# Patient Record
Sex: FEMALE | Race: WHITE | NOT HISPANIC OR LATINO | Employment: STUDENT | URBAN - METROPOLITAN AREA
[De-identification: names, ages, dates, MRNs, and addresses within clinical notes are randomized per-mention and may not be internally consistent; named-entity substitution may affect disease eponyms.]

---

## 2019-09-04 ENCOUNTER — TRANSCRIBE ORDERS (OUTPATIENT)
Dept: GENERAL RADIOLOGY | Facility: HOSPITAL | Age: 19
End: 2019-09-04

## 2019-09-04 ENCOUNTER — HOSPITAL ENCOUNTER (OUTPATIENT)
Dept: GENERAL RADIOLOGY | Facility: HOSPITAL | Age: 19
Discharge: HOME | End: 2019-09-04
Admitting: PEDIATRICS

## 2019-09-04 DIAGNOSIS — M53.3 COCCYX PAIN: ICD-10-CM

## 2019-09-04 DIAGNOSIS — M53.3 COCCYX PAIN: Primary | ICD-10-CM

## 2019-09-04 PROCEDURE — 72220 X-RAY EXAM SACRUM TAILBONE: CPT

## 2020-02-17 ENCOUNTER — OFFICE VISIT (OUTPATIENT)
Dept: INTERNAL MEDICINE | Facility: CLINIC | Age: 20
End: 2020-02-17

## 2020-02-17 VITALS
BODY MASS INDEX: 26.21 KG/M2 | OXYGEN SATURATION: 99 % | HEART RATE: 90 BPM | HEIGHT: 67 IN | DIASTOLIC BLOOD PRESSURE: 60 MMHG | SYSTOLIC BLOOD PRESSURE: 122 MMHG | WEIGHT: 167 LBS

## 2020-02-17 DIAGNOSIS — Z13.29 SCREENING FOR THYROID DISORDER: ICD-10-CM

## 2020-02-17 DIAGNOSIS — Z30.011 ENCOUNTER FOR INITIAL PRESCRIPTION OF CONTRACEPTIVE PILLS: Primary | ICD-10-CM

## 2020-02-17 DIAGNOSIS — Z13.220 SCREENING CHOLESTEROL LEVEL: ICD-10-CM

## 2020-02-17 PROCEDURE — 99203 OFFICE O/P NEW LOW 30 MIN: CPT | Performed by: PHYSICIAN ASSISTANT

## 2020-02-17 RX ORDER — NORGESTIMATE AND ETHINYL ESTRADIOL 7DAYSX3 28
1 KIT ORAL DAILY
Qty: 90 TABLET | Refills: 3 | Status: SHIPPED | OUTPATIENT
Start: 2020-02-17 | End: 2021-01-18 | Stop reason: SDUPTHER

## 2020-02-17 NOTE — PATIENT INSTRUCTIONS

## 2020-02-17 NOTE — PROGRESS NOTES
Patient Care Team:  Sally Aponte MD as PCP - General (Pediatrics)    Chief Complaint::   Chief Complaint   Patient presents with   • Establish Care     Last saw bran Chase        Subjective     HPI  19 years old, karen [employer], majoring sociology.    Goes to gym 1/month. Lives in an apartment, here in [place], with another roommate.  Eats most meals at home.  PMH: Cyst removed around coccyx 7 years ago.  Pt is sexually active.  Desires OCP.  Menses are regular.  LMP now. She states that she did have STD testing through school.  She does not know if she had Gardasil vaccine.        The following portions of the patient's history were reviewed and updated as appropriate: active problem list, medication list, allergies, family history, social history    Review of Systems:   Review of Systems   Constitutional: Negative for activity change, appetite change, chills, diaphoresis, fatigue, fever, unexpected weight gain and unexpected weight loss.   HENT: Negative for congestion, ear pain, hearing loss and sinus pressure.    Eyes: Negative for visual disturbance.   Respiratory: Negative for cough, chest tightness, shortness of breath and wheezing.    Cardiovascular: Negative for chest pain, palpitations and leg swelling.   Gastrointestinal: Negative for abdominal pain, blood in stool, constipation, GERD and indigestion.   Endocrine: Negative for cold intolerance and heat intolerance.   Genitourinary: Negative for dysuria and hematuria.   Musculoskeletal: Negative for arthralgias and myalgias.   Skin: Negative for color change and skin lesions.   Allergic/Immunologic: Negative for environmental allergies.   Neurological: Negative for dizziness, tremors, seizures, syncope, speech difficulty, weakness, headache, memory problem and confusion.   Hematological: Does not bruise/bleed easily.   Psychiatric/Behavioral: Negative for decreased concentration, sleep disturbance and depressed mood. The patient is not  "nervous/anxious.        Vital Signs  Vitals:    02/17/20 0812   BP: 122/60   BP Location: Left arm   Patient Position: Sitting   Cuff Size: Large Adult   Pulse: 90   SpO2: 99%   Weight: 75.8 kg (167 lb)   Height: 170.2 cm (67\")   PainSc: 0-No pain     Body mass index is 26.16 kg/m².    Labs  No visits with results within 3 Month(s) from this visit.   Latest known visit with results is:   No results found for any previous visit.       Imaging  No radiology results for the last 30 days.      Current Outpatient Medications:   •  norgestimate-ethinyl estradiol (TRI-SPRINTEC) 0.18/0.215/0.25 MG-35 MCG per tablet, Take 1 tablet by mouth Daily., Disp: 90 tablet, Rfl: 3    Physical Exam:    Physical Exam   Constitutional: She is oriented to person, place, and time. She appears well-developed and well-nourished.   HENT:   Head: Normocephalic and atraumatic.   Right Ear: Hearing, tympanic membrane, external ear and ear canal normal.   Left Ear: Hearing, tympanic membrane, external ear and ear canal normal.   Nose: Nose normal.   Mouth/Throat: Uvula is midline, oropharynx is clear and moist and mucous membranes are normal.   Eyes: Pupils are equal, round, and reactive to light. Conjunctivae, EOM and lids are normal.   Neck: Normal range of motion and full passive range of motion without pain. Neck supple.   Cardiovascular: Normal rate, regular rhythm, normal heart sounds and intact distal pulses.   Pulmonary/Chest: Effort normal and breath sounds normal.   Abdominal: Soft. Normal appearance and bowel sounds are normal.   Musculoskeletal: Normal range of motion.   Neurological: She is alert and oriented to person, place, and time. She has normal strength and normal reflexes.   Skin: Skin is warm, dry and intact.   Psychiatric: She has a normal mood and affect. Her speech is normal and behavior is normal. Judgment and thought content normal. Cognition and memory are normal.   Vitals reviewed.      Procedures    "     Assessment/Plan   Problem List Items Addressed This Visit        Other    Encounter for initial prescription of contraceptive pills - Primary    Relevant Medications    norgestimate-ethinyl estradiol (TRI-SPRINTEC) 0.18/0.215/0.25 MG-35 MCG per tablet    Screening cholesterol level    Relevant Orders    CBC & Differential    Comprehensive Metabolic Panel    Lipid Panel    Screening for thyroid disorder    Relevant Orders    TSH    T4, Free    BMI 26.0-26.9,adult    Current Assessment & Plan     Discussed importance of regular cardiovascular exercise 3-4 times a week.  Discussed healthy eating-more fresh fruits and veggies.                Return in about 1 year (around 2/17/2021) for Recheck.    Plan of care reviewed with patient at the conclusion of today's visit. Education was provided regarding diagnosis, management, and any prescribed or recommended OTC medications.Patient verbalizes understanding of and agreement with management plan.       Coby Lopez PA-C    Please note that portions of this note were completed with a voice recognition program. Efforts were made to edit the dictations, but occasionally words are mistranscribed.

## 2020-02-17 NOTE — ASSESSMENT & PLAN NOTE
Discussed importance of regular cardiovascular exercise 3-4 times a week.  Discussed healthy eating-more fresh fruits and veggies.

## 2020-09-16 ENCOUNTER — TELEPHONE (OUTPATIENT)
Dept: INTERNAL MEDICINE | Facility: CLINIC | Age: 20
End: 2020-09-16

## 2020-09-16 NOTE — TELEPHONE ENCOUNTER
Labs ordered 02/17/20. Mychart reminder sent 08/07/20. Reminder letter mailed 03/06/20. Can we cancel lab orders?

## 2021-01-18 DIAGNOSIS — Z30.011 ENCOUNTER FOR INITIAL PRESCRIPTION OF CONTRACEPTIVE PILLS: ICD-10-CM

## 2021-01-18 RX ORDER — NORGESTIMATE AND ETHINYL ESTRADIOL 7DAYSX3 28
1 KIT ORAL DAILY
Qty: 90 TABLET | Refills: 3 | Status: SHIPPED | OUTPATIENT
Start: 2021-01-18 | End: 2021-12-23 | Stop reason: SDUPTHER

## 2021-01-18 NOTE — TELEPHONE ENCOUNTER
Caller: Socorro Spaulding    Relationship: Self    Best call back number:[phone]    Medication needed:   Requested Prescriptions     Pending Prescriptions Disp Refills   • norgestimate-ethinyl estradiol (Tri-Sprintec) 0.18/0.215/0.25 MG-35 MCG per tablet 90 tablet 3     Sig: Take 1 tablet by mouth Daily.       When do you need the refill by: ASAP        Does the patient have less than a 3 day supply:  [x] Yes  [] No    What is the patient's preferred pharmacy: [facility] [facility] [phone] [facility] [phone] FX           PLEASE CALL BACK AND ADVISE :    [phone]

## 2021-02-23 ENCOUNTER — LAB (OUTPATIENT)
Dept: LAB | Facility: HOSPITAL | Age: 21
End: 2021-02-23

## 2021-02-23 ENCOUNTER — OFFICE VISIT (OUTPATIENT)
Dept: INTERNAL MEDICINE | Facility: CLINIC | Age: 21
End: 2021-02-23

## 2021-02-23 VITALS
HEART RATE: 95 BPM | HEIGHT: 67 IN | WEIGHT: 141.2 LBS | OXYGEN SATURATION: 98 % | SYSTOLIC BLOOD PRESSURE: 103 MMHG | TEMPERATURE: 97.8 F | BODY MASS INDEX: 22.16 KG/M2 | DIASTOLIC BLOOD PRESSURE: 68 MMHG

## 2021-02-23 DIAGNOSIS — Z20.2 EXPOSURE TO STD: Primary | ICD-10-CM

## 2021-02-23 DIAGNOSIS — Z20.2 EXPOSURE TO STD: ICD-10-CM

## 2021-02-23 DIAGNOSIS — Z30.011 ENCOUNTER FOR INITIAL PRESCRIPTION OF CONTRACEPTIVE PILLS: ICD-10-CM

## 2021-02-23 DIAGNOSIS — Z01.419 ROUTINE GYNECOLOGICAL EXAMINATION: ICD-10-CM

## 2021-02-23 DIAGNOSIS — A60.00 GENITAL HERPES SIMPLEX TYPE 1 INFECTION: ICD-10-CM

## 2021-02-23 PROCEDURE — 86592 SYPHILIS TEST NON-TREP QUAL: CPT

## 2021-02-23 PROCEDURE — G0432 EIA HIV-1/HIV-2 SCREEN: HCPCS

## 2021-02-23 PROCEDURE — 99395 PREV VISIT EST AGE 18-39: CPT | Performed by: PHYSICIAN ASSISTANT

## 2021-02-23 RX ORDER — ACYCLOVIR 400 MG/1
400 TABLET ORAL 2 TIMES DAILY
COMMUNITY
End: 2022-08-19 | Stop reason: SDUPTHER

## 2021-02-23 NOTE — PROGRESS NOTES
Patient Care Team:  Coby Lopez PA-C as PCP - General (Internal Medicine)    Chief Complaint::   Chief Complaint   Patient presents with   • Annual Exam     physical with pap        Subjective     HPI  20 year old female presents for routine gynecological exam.  Currently using Tri-Sprintec as directed.  She is sexually active.  She denies dyspareunia, vaginal discharge, or vaginal odor.  This is her first Pap smear.  Graduating in May. 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   Exercises once weekly. Tries to eat healthy.     Type I HSV positive-had initial outbreak on labia and was treated in June. Had full STD panel in June.   On menses right now.  She is on day 2.         The following portions of the patient's history were reviewed and updated as appropriate: active problem list, medication list, allergies, family history, social history    Review of Systems:   Review of Systems   Constitutional: Negative for activity change, appetite change, diaphoresis, fatigue, unexpected weight gain and unexpected weight loss.   HENT: Negative for hearing loss.    Eyes: Negative for visual disturbance.   Respiratory: Negative for chest tightness and shortness of breath.    Cardiovascular: Negative for chest pain, palpitations and leg swelling.   Gastrointestinal: Negative for abdominal pain, blood in stool, GERD and indigestion.   Endocrine: Negative for cold intolerance and heat intolerance.   Genitourinary: Positive for vaginal bleeding. Negative for dysuria, hematuria, menstrual problem, vaginal discharge and vaginal pain.   Musculoskeletal: Negative for arthralgias and myalgias.   Skin: Negative for skin lesions.   Neurological: Negative for tremors, seizures, syncope, speech difficulty, weakness, headache, memory problem and confusion.   Hematological: Does not bruise/bleed easily.   Psychiatric/Behavioral: Negative for sleep disturbance and depressed mood. The patient is not nervous/anxious.        Vital  "Signs  Vitals:    02/23/21 1515   BP: 103/68   BP Location: Right arm   Patient Position: Sitting   Cuff Size: Adult   Pulse: 95   Temp: 97.8 °F (36.6 °C)   TempSrc: Temporal   SpO2: 98%   Weight: 64 kg (141 lb 3.2 oz)   Height: 170.2 cm (67.01\")   PainSc: 0-No pain     Body mass index is 22.11 kg/m².    Labs  No visits with results within 3 Month(s) from this visit.   Latest known visit with results is:   No results found for any previous visit.       Imaging  No radiology results for the last 30 days.      Current Outpatient Medications:   •  acyclovir (ZOVIRAX) 400 MG tablet, Take 400 mg by mouth 2 (Two) Times a Day. Take no more than 5 doses a day., Disp: , Rfl:   •  norgestimate-ethinyl estradiol (Tri-Sprintec) 0.18/0.215/0.25 MG-35 MCG per tablet, Take 1 tablet by mouth Daily., Disp: 90 tablet, Rfl: 3    Physical Exam:    Physical Exam  Exam conducted with a chaperone present.   Constitutional:       Appearance: Normal appearance.   HENT:      Head: Normocephalic and atraumatic.   Eyes:      Extraocular Movements: Extraocular movements intact.      Conjunctiva/sclera: Conjunctivae normal.      Pupils: Pupils are equal, round, and reactive to light.   Neck:      Musculoskeletal: Neck supple.   Cardiovascular:      Rate and Rhythm: Normal rate and regular rhythm.      Pulses: Normal pulses.      Heart sounds: Normal heart sounds.   Chest:      Breasts:         Right: Normal. No mass, nipple discharge or tenderness.         Left: Normal. No mass, nipple discharge or tenderness.       Abdominal:      General: Abdomen is flat. Bowel sounds are normal.      Palpations: Abdomen is soft.   Genitourinary:     General: Normal vulva.      Rectum: Normal.   Skin:     General: Skin is warm and dry.   Neurological:      General: No focal deficit present.      Mental Status: She is alert and oriented to person, place, and time. Mental status is at baseline.   Psychiatric:         Mood and Affect: Mood normal.         Thought " Content: Thought content normal.         Judgment: Judgment normal.         Procedures        Assessment/Plan   Problem List Items Addressed This Visit        Endocrine and Metabolic    BMI 22.0-22.9, adult    Overview     Discussed importance of healthy diet and regular cardiovascular exercise.            Genitourinary and Reproductive     Encounter for initial prescription of contraceptive pills    Overview     Continue norgestimate ethinyl estradiol tablets daily.  Refill for 1 year was sent in January.         Relevant Medications    norgestimate-ethinyl estradiol (Tri-Sprintec) 0.18/0.215/0.25 MG-35 MCG per tablet       Health Encounters    Routine gynecological examination    Overview     Breast exam unremarkable.  Pap obtained.  Pelvic exam within normal range.  Patient was on her menstrual cycle.  Additional testing for HPV, gonorrhea, and chlamydia obtained.         Relevant Orders    Liquid-based Pap Smear, Screening       Infectious Diseases    Genital herpes simplex type 1 infection    Overview     Diagnosed summer 2020.  Patient currently takes acyclovir 400 mg daily.         Relevant Medications    acyclovir (ZOVIRAX) 400 MG tablet    Exposure to STD - Primary    Relevant Orders    RPR    HIV-1 / O / 2 Ag / Antibody 4th Generation        Patient Wellness Counseling:   Plan of care reviewed with patient at the conclusion of today's visit. Education was provided in regards to diagnosis, diet and exercise, cervical cancer screening, self breast exams.  Nutrition, family planning/contraception, physical activity, healthy weight,ways to reduce stress, adequate sleep, injury prevention, misuse of tobacco, alcohol and drugs, sexual behavior and STD's, dental health, mental health, and immunizations.    Management and any prescribed or recommended OTC medications.  Patient verbalizes understanding of and agreement with management plan.    Return in about 1 year (around 2/23/2022) for Annual physical with  pap.    Plan of care reviewed with patient at the conclusion of today's visit. Education was provided regarding diagnosis, management, and any prescribed or recommended OTC medications.Patient verbalizes understanding of and agreement with management plan.       Coby Lopez PA-C    Please note that portions of this note were completed with a voice recognition program. Efforts were made to edit the dictations, but occasionally words are mistranscribed.

## 2021-02-24 LAB
HIV1+2 AB SER QL: NORMAL
RPR SER QL: NORMAL

## 2021-03-05 ENCOUNTER — TELEPHONE (OUTPATIENT)
Dept: INTERNAL MEDICINE | Facility: CLINIC | Age: 21
End: 2021-03-05

## 2021-03-05 NOTE — TELEPHONE ENCOUNTER
The phone # in the chart is moms. Mom gave me daughters # [phone]. I have called and left a voicemail. I will send a portal.

## 2021-03-05 NOTE — TELEPHONE ENCOUNTER
LM for pt to call back.    ----- Message from Coby Lopez PA-C sent at 3/4/2021  5:28 PM EST -----  NONDIAGNOSTIC SPECIMEN. HPV AND STD PANEL NEGATIVE.  She will have to return in 4-6 weeks for repeat pap     Seizure

## 2021-03-29 ENCOUNTER — IMMUNIZATION (OUTPATIENT)
Dept: VACCINE CLINIC | Facility: HOSPITAL | Age: 21
End: 2021-03-29

## 2021-03-29 PROCEDURE — 91300 HC SARSCOV02 VAC 30MCG/0.3ML IM: CPT | Performed by: INTERNAL MEDICINE

## 2021-03-29 PROCEDURE — 0001A: CPT | Performed by: INTERNAL MEDICINE

## 2021-04-13 ENCOUNTER — OFFICE VISIT (OUTPATIENT)
Dept: INTERNAL MEDICINE | Facility: CLINIC | Age: 21
End: 2021-04-13

## 2021-04-13 VITALS
DIASTOLIC BLOOD PRESSURE: 69 MMHG | OXYGEN SATURATION: 100 % | SYSTOLIC BLOOD PRESSURE: 98 MMHG | WEIGHT: 140.2 LBS | BODY MASS INDEX: 22 KG/M2 | HEIGHT: 67 IN | HEART RATE: 83 BPM | TEMPERATURE: 98.6 F

## 2021-04-13 DIAGNOSIS — Z12.72 SMEAR, VAGINAL, AS PART OF ROUTINE GYNECOLOGICAL EXAMINATION: Primary | ICD-10-CM

## 2021-04-13 DIAGNOSIS — Z01.419 SMEAR, VAGINAL, AS PART OF ROUTINE GYNECOLOGICAL EXAMINATION: Primary | ICD-10-CM

## 2021-04-13 NOTE — PROGRESS NOTES
"Patient Care Team:  Coby Lopez PA-C as PCP - General (Internal Medicine)    Chief Complaint::   Chief Complaint   Patient presents with   • repeat pap smear        Subjective     HPI  Socorro is a 20-year-old female who presents today for repeat Pap due to nondiagnostic specimen of last Pap smear performed February 23, 2021.  This is a no charge visit.        The following portions of the patient's history were reviewed and updated as appropriate: active problem list, medication list, allergies, family history, social history    Review of Systems:   Review of Systems   Constitutional: Negative for activity change, appetite change, diaphoresis, fatigue, unexpected weight gain and unexpected weight loss.   HENT: Negative for hearing loss.    Eyes: Negative for visual disturbance.   Respiratory: Negative for chest tightness and shortness of breath.    Cardiovascular: Negative for chest pain, palpitations and leg swelling.   Gastrointestinal: Negative for abdominal pain, blood in stool, GERD and indigestion.   Endocrine: Negative for cold intolerance and heat intolerance.   Genitourinary: Negative for dysuria and hematuria.   Musculoskeletal: Negative for arthralgias and myalgias.   Skin: Negative for skin lesions.   Neurological: Negative for tremors, seizures, syncope, speech difficulty, weakness, headache, memory problem and confusion.   Hematological: Does not bruise/bleed easily.   Psychiatric/Behavioral: Negative for sleep disturbance and depressed mood. The patient is not nervous/anxious.        Vital Signs  Vitals:    04/13/21 1351   BP: 98/69   BP Location: Left arm   Patient Position: Sitting   Cuff Size: Adult   Pulse: 83   Temp: 98.6 °F (37 °C)   TempSrc: Temporal   SpO2: 100%   Weight: 63.6 kg (140 lb 3.2 oz)   Height: 170.2 cm (67.01\")   PainSc: 0-No pain     Body mass index is 21.95 kg/m².    Labs  Lab on 02/23/2021   Component Date Value Ref Range Status   • RPR 02/23/2021 Non-Reactive  " Non-Reactive Final   • HIV-1/ HIV-2 02/23/2021 Non-Reactive  Non-Reactive Final    A non-reactive test result does not preclude the possibility of exposure to HIV or infection with HIV. An antibody response to recent exposure may take several months to reach detectable levels.       Imaging  No radiology results for the last 30 days.      Current Outpatient Medications:   •  acyclovir (ZOVIRAX) 400 MG tablet, Take 400 mg by mouth 2 (Two) Times a Day. Take no more than 5 doses a day., Disp: , Rfl:   •  norgestimate-ethinyl estradiol (Tri-Sprintec) 0.18/0.215/0.25 MG-35 MCG per tablet, Take 1 tablet by mouth Daily., Disp: 90 tablet, Rfl: 3    Physical Exam:    Physical Exam  Exam conducted with a chaperone present.   Genitourinary:     General: Normal vulva.      Vagina: Normal.      Cervix: Normal.      Uterus: Normal.       Rectum: Normal.   Psychiatric:         Mood and Affect: Mood normal.         Behavior: Behavior normal.         Thought Content: Thought content normal.         Judgment: Judgment normal.         Procedures        Assessment/Plan   Problem List Items Addressed This Visit        Genitourinary and Reproductive     Smear, vaginal, as part of routine gynecological examination - Primary    Current Assessment & Plan     Repeat Pap smear due to nondiagnostic specimen.         Relevant Orders    Liquid-based Pap Smear, Screening          No follow-ups on file.    Plan of care reviewed with patient at the conclusion of today's visit. Education was provided regarding diagnosis, management, and any prescribed or recommended OTC medications.Patient verbalizes understanding of and agreement with management plan.       Yanelis Granados MA    Please note that portions of this note were completed with a voice recognition program. Efforts were made to edit the dictations, but occasionally words are mistranscribed.

## 2021-04-16 ENCOUNTER — TELEPHONE (OUTPATIENT)
Dept: INTERNAL MEDICINE | Facility: CLINIC | Age: 21
End: 2021-04-16

## 2021-04-19 ENCOUNTER — IMMUNIZATION (OUTPATIENT)
Dept: VACCINE CLINIC | Facility: HOSPITAL | Age: 21
End: 2021-04-19

## 2021-04-19 PROCEDURE — 91300 HC SARSCOV02 VAC 30MCG/0.3ML IM: CPT | Performed by: INTERNAL MEDICINE

## 2021-04-19 PROCEDURE — 0002A: CPT | Performed by: INTERNAL MEDICINE

## 2021-12-23 DIAGNOSIS — Z30.011 ENCOUNTER FOR INITIAL PRESCRIPTION OF CONTRACEPTIVE PILLS: ICD-10-CM

## 2021-12-23 RX ORDER — NORGESTIMATE AND ETHINYL ESTRADIOL 7DAYSX3 28
1 KIT ORAL DAILY
Qty: 90 TABLET | Refills: 3 | Status: SHIPPED | OUTPATIENT
Start: 2021-12-23 | End: 2022-11-21 | Stop reason: SDUPTHER

## 2021-12-23 NOTE — TELEPHONE ENCOUNTER
Caller: Socorro Spaulding    Relationship: Self    Best call back number: [phone]    Requested Prescriptions:   Requested Prescriptions     Pending Prescriptions Disp Refills   • norgestimate-ethinyl estradiol (Tri-Sprintec) 0.18/0.215/0.25 MG-35 MCG per tablet 90 tablet 3     Sig: Take 1 tablet by mouth Daily.        Pharmacy where request should be sent: [address] [facility] [phone] [facility] [phone] FX     Additional details provided by patient: REQUESTING A REFILL ON BIRTH CONTROL UNTIL APPOINTMENT SCHEDULED ON 2/24/22    Does the patient have less than a 3 day supply:  [] Yes  [x] No    Ham Hugo Rep   12/23/21 10:11 EST

## 2021-12-23 NOTE — TELEPHONE ENCOUNTER
Rx Refill Note  Requested Prescriptions     Pending Prescriptions Disp Refills   • norgestimate-ethinyl estradiol (Tri-Sprintec) 0.18/0.215/0.25 MG-35 MCG per tablet 90 tablet 3     Sig: Take 1 tablet by mouth Daily.      Last office visit with prescribing clinician: 4/13/2021      Next office visit with prescribing clinician: 2/24/2022            Anny Hines LPN  12/23/21, 13:22 EST

## 2021-12-23 NOTE — TELEPHONE ENCOUNTER
Caller: Socorro Spaulding    Relationship: Self    Best call back number: [phone]    What is the best time to reach you: ANYTIME    Who are you requesting to speak with (clinical staff, provider,  specific staff member): CLINICAL STAFF    What was the call regarding: PATIENT STATES THAT SHE IS COVID POSITIVE AND WOULD LIKE TO KNOW WHAT IS THE QUARANTINE GUIDELINE . PATIENT IS NOT ABLE TO GET IN TOUCH WITH THE HEALTH DEPARTMENT    Do you require a callback: YES

## 2022-02-24 ENCOUNTER — OFFICE VISIT (OUTPATIENT)
Dept: INTERNAL MEDICINE | Facility: CLINIC | Age: 22
End: 2022-02-24

## 2022-02-24 VITALS
SYSTOLIC BLOOD PRESSURE: 122 MMHG | HEART RATE: 76 BPM | WEIGHT: 146 LBS | BODY MASS INDEX: 22.91 KG/M2 | OXYGEN SATURATION: 100 % | DIASTOLIC BLOOD PRESSURE: 88 MMHG | TEMPERATURE: 98 F | HEIGHT: 67 IN

## 2022-02-24 DIAGNOSIS — Z13.29 THYROID DISORDER SCREENING: Primary | ICD-10-CM

## 2022-02-24 DIAGNOSIS — A60.00 GENITAL HERPES SIMPLEX TYPE 1 INFECTION: ICD-10-CM

## 2022-02-24 DIAGNOSIS — Z13.21 ENCOUNTER FOR VITAMIN DEFICIENCY SCREENING: ICD-10-CM

## 2022-02-24 DIAGNOSIS — Z13.220 LIPID SCREENING: ICD-10-CM

## 2022-02-24 DIAGNOSIS — Z00.00 ROUTINE PHYSICAL EXAMINATION: ICD-10-CM

## 2022-02-24 PROCEDURE — 99395 PREV VISIT EST AGE 18-39: CPT | Performed by: PHYSICIAN ASSISTANT

## 2022-02-24 NOTE — PROGRESS NOTES
"QUICK REFERENCE INFORMATION:  The ABCs of the Annual Wellness Visit    Annual Wellness visit    HEALTH RISK ASSESSMENT    2000    Recent History  {Hospitalization history:0108310685::\"No hospitalization(s) within the last year.\"}.        Current Medical Providers:  Patient Care Team:  Coby Lopez PA-C as PCP - General (Internal Medicine)        Smoking Status:  Social History     Tobacco Use   Smoking Status Never Smoker   Smokeless Tobacco Never Used       Alcohol Consumption:  Social History     Substance and Sexual Activity   Alcohol Use Never       Depression Screen:   PHQ-2/PHQ-9 Depression Screening 2/23/2021   Little interest or pleasure in doing things 0   Feeling down, depressed, or hopeless 0   Total Score 0       Health Habits:              Recent Lab Results:  CMP:     Lipid Panel:     HbA1c:           Age-appropriate Screening Schedule:  Refer to the list below for future screening recommendations based on patient's age, sex and/or medical conditions. Orders for these recommended tests are listed in the plan section. The patient has been provided with a written plan.    Health Maintenance   Topic Date Due   • TDAP/TD VACCINES (1 - Tdap) Never done   • CHLAMYDIA SCREENING  Never done   • INFLUENZA VACCINE  Never done   • PAP SMEAR  04/13/2024        Subjective   History of Present Illness    Socorro Spaulding is a 21 y.o. female who presents for an Annual Wellness Visit.    The following portions of the patient's history were reviewed and updated as appropriate: {history reviewed:20406::\"allergies\",\"current medications\",\"past family history\",\"past medical history\",\"past social history\",\"past surgical history\",\"problem list\"}.    Outpatient Medications Prior to Visit   Medication Sig Dispense Refill   • acyclovir (ZOVIRAX) 400 MG tablet Take 400 mg by mouth 2 (Two) Times a Day. Take no more than 5 doses a day.     • norgestimate-ethinyl estradiol (Tri-Sprintec) 0.18/0.215/0.25 MG-35 " "MCG per tablet Take 1 tablet by mouth Daily. 90 tablet 3     No facility-administered medications prior to visit.       Patient Active Problem List   Diagnosis   • Encounter for initial prescription of contraceptive pills   • Screening cholesterol level   • Screening for thyroid disorder   • BMI 22.0-22.9, adult   • Genital herpes simplex type 1 infection   • Routine gynecological examination   • Exposure to STD   • Smear, vaginal, as part of routine gynecological examination       Advance Care Planning:  {Advance Directive Status:09610}    Identification of Risk Factors:  Risk factors include: {MC; WELLNESS RISK FACTORS:95768}.    Review of Systems    Compared to one year ago, the patient feels her physical health is {better worse same:94185}.  Compared to one year ago, the patient feels her mental health is {better worse same:30842}.    Objective     Physical Exam    Vitals:    02/24/22 1552   Height: 170.2 cm (67.01\")       Patient's Body mass index is 21.95 kg/m². indicating that she is {weight categories:13835}.      CMP:     HbA1c:  No results found for: HGBA1C  Microalbumin:  No results found for: MICROALBUR, POCMALB, POCCREAT  Lipid Panel  No results found for: CHOL, TRIG, HDL, LDL, AST, ALT    Assessment/Plan   Patient Self-Management and Personalized Health Advice  The patient has been provided with information about: {; PERSONALIZED HEALTH ADVICE:92691} and preventive services including:   · {plan:22045}.    Problem List Items Addressed This Visit     None           There are no Patient Instructions on file for this visit.    Outpatient Encounter Medications as of 2/24/2022   Medication Sig Dispense Refill   • acyclovir (ZOVIRAX) 400 MG tablet Take 400 mg by mouth 2 (Two) Times a Day. Take no more than 5 doses a day.     • norgestimate-ethinyl estradiol (Tri-Sprintec) 0.18/0.215/0.25 MG-35 MCG per tablet Take 1 tablet by mouth Daily. 90 tablet 3     No facility-administered encounter medications on file " as of 2/24/2022.       Reviewed use of high risk medication in the elderly: {Response; yes/no/na:63}  Reviewed for potential of harmful drug interactions in the elderly: {Response; yes/no/na:63}    Follow Up:  No follow-ups on file.     An After Visit Summary and PPPS with all of these plans were given to the patient.           {Time Spent (Optional):28789}    Yanelis Granados MA    Note: Part of this note may be an electronic transcription/translation of spoken language to printed text using the Dragon Dictation System.

## 2022-02-24 NOTE — PROGRESS NOTES
[facility] Internal Medicine    Socorro Spaulding  2000   [id number]      Patient Care Team:  Coby Lopez PA-C as PCP - General (Internal Medicine)    Chief Complaint::   Chief Complaint   Patient presents with   • Annual Exam        HPI  Socorro is a 21 year old female with history of herpes type I who presents for routine physical exam.  Socorro had 2 Covid vaccinations, did not receive booster.  She had COVID in December in 2021. Mild symptoms.  She denies chest pain, shortness of breath, palpitations, or headaches.  She denies flu vaccine.  She is single and lives with a friend in an apartment. She works full time at [employer].   She is exercising regularly-does cardio/weight training 4 days a week.  She eats out on the weekends.       Patient Active Problem List   Diagnosis   • Encounter for initial prescription of contraceptive pills   • Screening cholesterol level   • Screening for thyroid disorder   • BMI 22.0-22.9, adult   • Genital herpes simplex type 1 infection   • Routine gynecological examination   • Exposure to STD   • Smear, vaginal, as part of routine gynecological examination   • Routine physical examination        History reviewed. No pertinent past medical history.    Past Surgical History:   Procedure Laterality Date   • CYST REMOVAL  2013   • WISDOM TOOTH EXTRACTION  04/2019       Family History   Problem Relation Age of Onset   • Cancer Maternal Grandmother    • Cancer Paternal Grandfather        Social History     Socioeconomic History   • Marital status: Single   Tobacco Use   • Smoking status: Never Smoker   • Smokeless tobacco: Never Used   Substance and Sexual Activity   • Alcohol use: Never   • Drug use: Never   • Sexual activity: Defer       No Known Allergies    Review of Systems   Constitutional: Positive for unexpected weight gain. Negative for activity change, appetite change, diaphoresis, fatigue and unexpected weight loss.   HENT: Negative for hearing loss.   "  Eyes: Negative for blurred vision, double vision and visual disturbance.   Respiratory: Negative for chest tightness and shortness of breath.    Cardiovascular: Negative for chest pain, palpitations and leg swelling.   Gastrointestinal: Negative for abdominal pain, blood in stool, GERD and indigestion.   Endocrine: Negative for cold intolerance and heat intolerance.   Genitourinary: Negative for dysuria, hematuria and menstrual problem.   Musculoskeletal: Negative for arthralgias and myalgias.   Skin: Negative for skin lesions.   Allergic/Immunologic: Negative for environmental allergies and food allergies.   Neurological: Negative for tremors, seizures, syncope, speech difficulty, weakness, headache, memory problem and confusion.   Hematological: Does not bruise/bleed easily.   Psychiatric/Behavioral: Negative for sleep disturbance and depressed mood. The patient is not nervous/anxious.         Vital Signs  Vitals:    02/24/22 1552   BP: 122/88   BP Location: Left arm   Patient Position: Sitting   Cuff Size: Adult   Pulse: 76   Temp: 98 °F (36.7 °C)   TempSrc: Temporal   SpO2: 100%   Weight: 66.2 kg (146 lb)   Height: 170.2 cm (67.01\")   PainSc: 0-No pain     Body mass index is 22.86 kg/m².  Patient's Body mass index is 22.86 kg/m². indicating that she is within normal range (BMI 18.5-24.9). No BMI management plan needed..         Current Outpatient Medications:   •  acyclovir (ZOVIRAX) 400 MG tablet, Take 400 mg by mouth 2 (Two) Times a Day. Take no more than 5 doses a day., Disp: , Rfl:   •  norgestimate-ethinyl estradiol (Tri-Sprintec) 0.18/0.215/0.25 MG-35 MCG per tablet, Take 1 tablet by mouth Daily., Disp: 90 tablet, Rfl: 3    Physical Exam  Vitals reviewed.   Constitutional:       Appearance: Normal appearance. She is well-developed and normal weight.   HENT:      Head: Normocephalic and atraumatic.      Right Ear: Hearing, tympanic membrane, ear canal and external ear normal.      Left Ear: Hearing, " tympanic membrane, ear canal and external ear normal.      Nose: Nose normal.      Mouth/Throat:      Mouth: Mucous membranes are moist.      Pharynx: Oropharynx is clear. Uvula midline. No posterior oropharyngeal erythema.   Eyes:      General: Lids are normal.      Extraocular Movements: Extraocular movements intact.      Conjunctiva/sclera: Conjunctivae normal.      Pupils: Pupils are equal, round, and reactive to light.   Cardiovascular:      Rate and Rhythm: Normal rate and regular rhythm.      Heart sounds: Normal heart sounds.   Pulmonary:      Effort: Pulmonary effort is normal.      Breath sounds: Normal breath sounds.   Abdominal:      General: Bowel sounds are normal.      Palpations: Abdomen is soft.      Tenderness: There is no right CVA tenderness or left CVA tenderness.   Musculoskeletal:         General: Normal range of motion.      Cervical back: Full passive range of motion without pain, normal range of motion and neck supple.   Skin:     General: Skin is warm and dry.   Neurological:      General: No focal deficit present.      Mental Status: She is alert and oriented to person, place, and time. Mental status is at baseline.      Deep Tendon Reflexes: Reflexes are normal and symmetric.   Psychiatric:         Mood and Affect: Mood normal.         Speech: Speech normal.         Behavior: Behavior normal.         Thought Content: Thought content normal.         Judgment: Judgment normal.          ACE III MINI            Results Review:    No results found for this or any previous visit (from the past 672 hour(s)).  Procedures    Medication Review: Medications reviewed and noted    Social History     Socioeconomic History   • Marital status: Single   Tobacco Use   • Smoking status: Never Smoker   • Smokeless tobacco: Never Used   Substance and Sexual Activity   • Alcohol use: Never   • Drug use: Never   • Sexual activity: Defer        Assessment/Plan:    Problem List Items Addressed This Visit         Health Encounters    Routine physical examination    Overview     Order for fasting labs placed.  She has completed 2 Covid vaccinations.  Discussed importance of booster dose.         Relevant Orders    CBC & Differential    Comprehensive Metabolic Panel       Infectious Diseases    Genital herpes simplex type 1 infection    Overview     Diagnosed summer 2020.  Has completed daily acyclovir for 1 year.  She has not had any other outbreaks.         Relevant Medications    acyclovir (ZOVIRAX) 400 MG tablet      Other Visit Diagnoses     Thyroid disorder screening    -  Primary    Relevant Orders    TSH    T4, Free    T3, Free    Lipid screening        Relevant Orders    Lipid Panel    Encounter for vitamin deficiency screening        Relevant Orders    Vitamin D 25 Hydroxy    Vitamin B12       Patient Wellness Counseling:   Plan of care reviewed with patient at the conclusion of today's visit. Education was provided in regards to diagnosis, diet and exercise, cervical cancer screening, self breast exams,   Nutrition, family planning/contraception, physical activity, healthy weight,ways to reduce stress, adequate sleep, injury prevention, misuse of tobacco, alcohol and drugs, sexual behavior and STD's, dental health, mental health, and immunizations.        There are no Patient Instructions on file for this visit.     Plan of care reviewed with patient at the conclusion of today's visit. Education was provided regarding diagnosis, management, and any prescribed or recommended OTC medications.Patient verbalizes understanding of and agreement with management plan.       I spent 21 minutes caring for Socorro on this date of service. This time includes time spent by me in the following activities:preparing for the visit, reviewing tests, obtaining and/or reviewing a separately obtained history, performing a medically appropriate examination and/or evaluation , counseling and educating the patient/family/caregiver, ordering  medications, tests, or procedures and documenting information in the medical record    Coby Lopez PA-C      Note: Part of this note may be an electronic transcription/translation of spoken language to printed text using the Dragon Dictation system.

## 2022-08-19 RX ORDER — ACYCLOVIR 400 MG/1
400 TABLET ORAL 2 TIMES DAILY
Qty: 30 TABLET | Refills: 1 | Status: SHIPPED | OUTPATIENT
Start: 2022-08-19 | End: 2023-02-27 | Stop reason: SDUPTHER

## 2022-08-19 NOTE — TELEPHONE ENCOUNTER
Caller: Socorro Spaulding    Relationship: Self    Best call back number: [phone]     Requested Prescriptions:   Requested Prescriptions     Pending Prescriptions Disp Refills   • acyclovir (ZOVIRAX) 400 MG tablet       Sig: Take 1 tablet by mouth 2 (Two) Times a Day. Take no more than 5 doses a day.        Pharmacy where request should be sent: [facility] [address] [facility] [phone] [facility] [phone]     Additional details provided by patient: PATIENT IS HAVING SORE ON HER GENITAL AREA.    Does the patient have less than a 3 day supply:  [x] Yes  [] No    Ham Sinclair Rep   08/19/22 08:56 EDT

## 2022-11-21 DIAGNOSIS — Z30.011 ENCOUNTER FOR INITIAL PRESCRIPTION OF CONTRACEPTIVE PILLS: ICD-10-CM

## 2022-11-21 RX ORDER — NORGESTIMATE AND ETHINYL ESTRADIOL 7DAYSX3 28
1 KIT ORAL DAILY
Qty: 90 TABLET | Refills: 3 | Status: SHIPPED | OUTPATIENT
Start: 2022-11-21 | End: 2023-02-27 | Stop reason: SDUPTHER

## 2022-11-21 NOTE — TELEPHONE ENCOUNTER
Caller: Socorro Spaulding    Relationship: Self    Best call back number: [phone]    Requested Prescriptions:   Requested Prescriptions     Pending Prescriptions Disp Refills   • norgestimate-ethinyl estradiol (Tri-Sprintec) 0.18/0.215/0.25 MG-35 MCG per tablet 90 tablet 3     Sig: Take 1 tablet by mouth Daily.        Pharmacy where request should be sent: [facility] [id number] [address] [facility] [phone] [facility] [phone] FX     Additional details provided by patient: 6 DAYS LEFT. PATIENT STATES SHE HAS NO REFILLS LEFT. PATIENT HAS AN APPOINTMENT FOR A PHYSICAL WITH PAP WITH PCP ON 2/27/23 AT 4:00 PM.    Does the patient have less than a 3 day supply:  [] Yes  [x] No    Ham Amato Rep   11/21/22 12:57 EST

## 2023-02-27 ENCOUNTER — OFFICE VISIT (OUTPATIENT)
Dept: INTERNAL MEDICINE | Facility: CLINIC | Age: 23
End: 2023-02-27
Payer: COMMERCIAL

## 2023-02-27 VITALS
SYSTOLIC BLOOD PRESSURE: 120 MMHG | TEMPERATURE: 98.4 F | WEIGHT: 154 LBS | HEIGHT: 67 IN | OXYGEN SATURATION: 99 % | HEART RATE: 97 BPM | DIASTOLIC BLOOD PRESSURE: 80 MMHG | BODY MASS INDEX: 24.17 KG/M2

## 2023-02-27 DIAGNOSIS — Z00.00 ROUTINE PHYSICAL EXAMINATION: Primary | ICD-10-CM

## 2023-02-27 DIAGNOSIS — Z30.011 ENCOUNTER FOR INITIAL PRESCRIPTION OF CONTRACEPTIVE PILLS: ICD-10-CM

## 2023-02-27 PROCEDURE — 99395 PREV VISIT EST AGE 18-39: CPT | Performed by: PHYSICIAN ASSISTANT

## 2023-02-27 RX ORDER — NORGESTIMATE AND ETHINYL ESTRADIOL 7DAYSX3 28
1 KIT ORAL DAILY
Qty: 90 TABLET | Refills: 3 | Status: SHIPPED | OUTPATIENT
Start: 2023-02-27 | End: 2024-02-27

## 2023-02-27 RX ORDER — ACYCLOVIR 400 MG/1
400 TABLET ORAL 2 TIMES DAILY
Qty: 30 TABLET | Refills: 1 | Status: SHIPPED | OUTPATIENT
Start: 2023-02-27

## 2023-03-07 LAB — REF LAB TEST METHOD: NORMAL

## 2023-03-10 DIAGNOSIS — B37.9 YEAST INFECTION: Primary | ICD-10-CM

## 2023-03-10 DIAGNOSIS — R87.612 LGSIL ON PAP SMEAR OF CERVIX: ICD-10-CM

## 2023-03-10 RX ORDER — FLUCONAZOLE 150 MG/1
150 TABLET ORAL ONCE
Qty: 2 TABLET | Refills: 2 | Status: SHIPPED | OUTPATIENT
Start: 2023-03-10 | End: 2023-03-10

## 2023-05-21 PROBLEM — Z01.419 WELL WOMAN EXAM WITH ROUTINE GYNECOLOGICAL EXAM: Status: ACTIVE | Noted: 2023-05-21

## 2023-05-22 ENCOUNTER — OFFICE VISIT (OUTPATIENT)
Dept: OBSTETRICS AND GYNECOLOGY | Facility: CLINIC | Age: 23
End: 2023-05-22
Payer: COMMERCIAL

## 2023-05-22 VITALS
WEIGHT: 152 LBS | SYSTOLIC BLOOD PRESSURE: 130 MMHG | HEIGHT: 67 IN | BODY MASS INDEX: 23.86 KG/M2 | DIASTOLIC BLOOD PRESSURE: 72 MMHG

## 2023-05-22 DIAGNOSIS — Z71.85 HPV VACCINE COUNSELING: ICD-10-CM

## 2023-05-22 DIAGNOSIS — R87.612 LGSIL ON PAP SMEAR OF CERVIX: Primary | ICD-10-CM

## 2023-05-22 DIAGNOSIS — Z23 NEED FOR HPV VACCINE: ICD-10-CM

## 2023-05-22 DIAGNOSIS — B97.7 HPV IN FEMALE: ICD-10-CM

## 2023-05-22 RX ORDER — FLUCONAZOLE 150 MG/1
150 TABLET ORAL AS NEEDED
COMMUNITY
Start: 2023-03-10

## 2023-05-22 NOTE — PROGRESS NOTES
"Subjective   Chief Complaint   Patient presents with   • Abnormal Pap Smear     LSIL w/ +HPV on 22     Socorro Spaulding is a 22 y.o. year old .  Patient's last menstrual period was 2023 (exact date).  She presents to be seen because of referral regarding abnormal pap smear. She is a non smoker. She uses OCPs and condoms for contraception. She had a normal pap cytology in . She does report a history of genital herpes and takes valtrex prn. She does not believe she has had the hpv vaccination.     OTHER THINGS SHE WANTS TO DISCUSS TODAY:  Nothing else    The following portions of the patient's history were reviewed and updated as appropriate:current medications, allergies, past family history, past medical history, past social history and past surgical history    Social History    Tobacco Use      Smoking status: Never      Smokeless tobacco: Never    Review of Systems  Constitutional POS: nothing reported    NEG: anorexia or night sweats   Genitourinary POS: nothing reported    NEG: dysuria or hematuria   Gastointestinal POS: nothing reported    NEG: bloating, change in bowel habits, melena or reflux symptoms   Integument POS: nothing reported    NEG: moles that are changing in size, shape, color or rashes   Breast POS: nothing reported    NEG: persistent breast lump, skin dimpling or nipple discharge         Objective   /72 (BP Location: Right arm, Patient Position: Sitting, Cuff Size: Adult)   Ht 170.2 cm (67\")   Wt 68.9 kg (152 lb)   LMP 2023 (Exact Date)   BMI 23.81 kg/m²     General:  well developed; well nourished  no acute distress   Skin:  Not performed.   Thyroid: not examined   Lungs:  breathing is unlabored   Heart:  Not performed.   Breasts:  Not performed.   Abdomen: Not performed.   Pelvis: Not performed.     Lab Review   PAP    Imaging   No data reviewed        Assessment   1. LGSIL pap, + other hpv  2. HPV vaccine counseling      Plan   1. Reviewed do not " normally test for HPV at less than 25 but given it was tested and was positive will have to proceed with colposcopy with possible cervical biopsies. Reviewed the procedure in detail.   2. Start hpv vaccine series   3. The importance of keeping all planned follow-up and taking all medications as prescribed was emphasized.  4. Follow up for colpo    No orders of the defined types were placed in this encounter.         This note was electronically signed.    Cassandra Moscoso MD  May 22, 2023

## 2023-05-22 NOTE — LETTER
"VACCINE CONSENT FORM      Patient Name:  Socorro Spaulding    Patient :  2000      I/We have read, or have been explained, the information about the diseases and the vaccines listed below.  There was an opportunity to ask questions and any questions were answered satisfactorily.  I/We believe that I/we understand the benefits and risks of the vaccines(s) cited, and ask the vaccine(s) listed below be given to me/us or the person named above (for which i have authorized to make the request).      Vaccine(s) give:    Orders Placed This Encounter   Procedures   • HPV Vaccine         Medicare patients:    The only vaccine covered under your medical benefit is flu/pneumonia and hepatitis B.  All other may be covered under your \"Part D\" prescription plan and requires you to go to a pharmacy with a Physician orders for administration.  If you still prefer to have it administered at our office, you will receive a bill for the vaccine and administration cost.               Patient Initials                     Patient or Parent/Guardian Signature                    Date        A copy of the appropriate Centers for Disease Control and Prevention Vaccine Information Statements has been provided.   "

## 2023-05-25 ENCOUNTER — PATIENT ROUNDING (BHMG ONLY) (OUTPATIENT)
Dept: OBSTETRICS AND GYNECOLOGY | Facility: CLINIC | Age: 23
End: 2023-05-25
Payer: COMMERCIAL

## 2023-05-25 NOTE — PROGRESS NOTES
A [facility]trland message has been sent to the patient for [place]IENT [place] [facility]

## 2023-10-23 DIAGNOSIS — Z00.00 ROUTINE PHYSICAL EXAMINATION: ICD-10-CM

## 2023-10-23 RX ORDER — NORGESTIMATE AND ETHINYL ESTRADIOL 7DAYSX3 28
1 KIT ORAL DAILY
Qty: 90 TABLET | Refills: 3 | Status: SHIPPED | OUTPATIENT
Start: 2023-10-23 | End: 2024-10-22

## 2023-11-29 ENCOUNTER — CLINICAL SUPPORT (OUTPATIENT)
Dept: OBSTETRICS AND GYNECOLOGY | Facility: CLINIC | Age: 23
End: 2023-11-29
Payer: COMMERCIAL

## 2023-11-29 DIAGNOSIS — Z23 NEED FOR HPV VACCINATION: Primary | ICD-10-CM

## 2024-01-24 ENCOUNTER — OFFICE VISIT (OUTPATIENT)
Dept: OBSTETRICS AND GYNECOLOGY | Facility: CLINIC | Age: 24
End: 2024-01-24
Payer: COMMERCIAL

## 2024-01-24 VITALS
WEIGHT: 163.4 LBS | RESPIRATION RATE: 14 BRPM | HEIGHT: 67 IN | BODY MASS INDEX: 25.65 KG/M2 | DIASTOLIC BLOOD PRESSURE: 74 MMHG | SYSTOLIC BLOOD PRESSURE: 102 MMHG

## 2024-01-24 DIAGNOSIS — R87.612 LGSIL ON PAP SMEAR OF CERVIX: Primary | ICD-10-CM

## 2024-01-24 NOTE — PROGRESS NOTES
"Subjective   Chief Complaint   Patient presents with    Follow-up     Repeat pap      Socorro Soren Spaulding is a 23 y.o. year old .  Patient's last menstrual period was 2024 (exact date).  She presents for 6 month repeat pap smear after negative colpo 2023 following LSIL, +HPV pap smear.     The following portions of the patient's history were reviewed and updated as appropriate:current medications, allergies, and past medical history    Social History    Tobacco Use      Smoking status: Never      Smokeless tobacco: Never         Objective   /74 (BP Location: Right arm, Patient Position: Sitting, Cuff Size: Adult)   Resp 14   Ht 170.2 cm (67\")   Wt 74.1 kg (163 lb 6.4 oz)   LMP 2024 (Exact Date)   Breastfeeding No   BMI 25.59 kg/m²     General:  well developed; well nourished  no acute distress   Lungs:  breathing is unlabored   Heart:  Not performed.   Pelvis: Clinical staff was present for exam  External genitalia:  normal appearance of the external genitalia including Bartholin's and Flat's glands.  :  urethral meatus normal;  Vaginal:  normal pink mucosa without prolapse or lesions.  Cervix:  normal appearance. ectropian present;     Lab Review   Last pap smear and colpo biopsies     Imaging   No data reviewed        Assessment   History of LSIL, +HPV pap with negative colpo biopsy     Plan   Pap was done today.  If she does not receive the results of the Pap within 2 weeks  time, she was instructed to call to find out the results.  I explained to Socorro that because she is being seen in follow-up of a previously abnormal Pap smear, her next Pap needs to be performed in 4-6 months.  She will need to be seen roughly every 6 months until 3 consecutive normal Paps have been obtained.  At that point, she can return to routine screening intervals.   The importance of keeping all planned follow-up and taking all medications as prescribed was emphasized.  Follow up with Dr. Moscoso " pending pap smear results or sooner PRN     No orders of the defined types were placed in this encounter.         This note was electronically signed.    Columba Hays MD   January 24, 2024

## 2024-01-25 LAB — REF LAB TEST METHOD: NORMAL

## 2024-01-26 PROBLEM — Z13.220 SCREENING CHOLESTEROL LEVEL: Status: RESOLVED | Noted: 2020-02-17 | Resolved: 2024-01-26

## 2024-01-26 PROBLEM — Z01.419 SMEAR, VAGINAL, AS PART OF ROUTINE GYNECOLOGICAL EXAMINATION: Status: RESOLVED | Noted: 2021-04-13 | Resolved: 2024-01-26

## 2024-01-26 PROBLEM — Z13.29 SCREENING FOR THYROID DISORDER: Status: RESOLVED | Noted: 2020-02-17 | Resolved: 2024-01-26

## 2024-01-26 PROBLEM — Z20.2 EXPOSURE TO STD: Status: RESOLVED | Noted: 2021-02-23 | Resolved: 2024-01-26

## 2024-01-26 PROBLEM — Z12.72 SMEAR, VAGINAL, AS PART OF ROUTINE GYNECOLOGICAL EXAMINATION: Status: RESOLVED | Noted: 2021-04-13 | Resolved: 2024-01-26

## 2024-03-13 RX ORDER — ACYCLOVIR 400 MG/1
400 TABLET ORAL 2 TIMES DAILY
Qty: 30 TABLET | Refills: 1 | Status: SHIPPED | OUTPATIENT
Start: 2024-03-13

## 2024-03-13 NOTE — TELEPHONE ENCOUNTER
Rx Refill Note  Requested Prescriptions     Pending Prescriptions Disp Refills    acyclovir (ZOVIRAX) 400 MG tablet 30 tablet 1     Sig: Take 1 tablet by mouth 2 (Two) Times a Day. Take no more than 5 doses a day.      Last office visit with prescribing clinician: 2/27/2023   Last telemedicine visit with prescribing clinician: Visit date not found   Next office visit with prescribing clinician: Visit date not found                         Would you like a call back once the refill request has been completed: [] Yes [] No    If the office needs to give you a call back, can they leave a voicemail: [] Yes [] No    Anny Hines LPN  03/13/24, 13:24 EDT

## 2024-07-01 ENCOUNTER — OFFICE VISIT (OUTPATIENT)
Dept: OBSTETRICS AND GYNECOLOGY | Facility: CLINIC | Age: 24
End: 2024-07-01
Payer: COMMERCIAL

## 2024-07-01 VITALS
DIASTOLIC BLOOD PRESSURE: 60 MMHG | SYSTOLIC BLOOD PRESSURE: 104 MMHG | RESPIRATION RATE: 16 BRPM | WEIGHT: 146 LBS | BODY MASS INDEX: 22.87 KG/M2

## 2024-07-01 DIAGNOSIS — R87.612 LGSIL ON PAP SMEAR OF CERVIX: Primary | ICD-10-CM

## 2024-07-01 DIAGNOSIS — Z00.00 ROUTINE PHYSICAL EXAMINATION: ICD-10-CM

## 2024-07-01 PROCEDURE — 99459 PELVIC EXAMINATION: CPT

## 2024-07-01 PROCEDURE — 99395 PREV VISIT EST AGE 18-39: CPT

## 2024-07-01 RX ORDER — NORGESTIMATE AND ETHINYL ESTRADIOL 7DAYSX3 28
1 KIT ORAL DAILY
Qty: 90 TABLET | Refills: 3 | Status: SHIPPED | OUTPATIENT
Start: 2024-07-01 | End: 2025-07-01

## 2024-07-01 NOTE — PROGRESS NOTES
Subjective   Chief Complaint   Patient presents with    Follow-up     repap    Annual Exam     Socorro Spaulding is a 23 y.o. year old  presenting to be seen for her annual exam. She is overall feeling well today.     SEXUAL Hx:  She is currently sexually active.  In the past year there there has been NO new sexual partners.    Condoms are always used.  She would not like to be screened for STD's at today's exam.  Current birth control method: OCP (estrogen/progesterone).  She is happy with her current method of contraception and does not want to discuss alternative methods of contraception.  MENSTRUAL Hx:  Patient's last menstrual period was 2024.  In the past 6 months her cycles have been regular, predictable and occur monthly.  Her menstrual flow is typically normal.   Each month on average there are roughly 2 day(s) of very heavy flow.  She typically bleeds for a full 6 days.   Intermenstrual bleeding is absent.    Post-coital bleeding is present - occasionally, likely a lubrication issue .  Dysmenorrhea: mild and is not affecting her activities of daily living  PMS: mild and is not affecting her activities of daily living  Her cycles are not a source of concern for her that she wishes to discuss today.  HEALTH Hx:  She exercises regularly: yes.  She wears her seat belt: yes.  She has concerns about domestic violence: no.  OTHER THINGS SHE WANTS TO DISCUSS TODAY:  Nothing else    The following portions of the patient's history were reviewed and updated as appropriate:problem list, current medications, allergies, past family history, past medical history, past social history, and past surgical history.    Social History    Tobacco Use      Smoking status: Never        Passive exposure: Never      Smokeless tobacco: Never      Review of Systems   Constitutional: Negative.  Negative for appetite change and diaphoresis.   Respiratory: Negative.     Cardiovascular: Negative.    Gastrointestinal:  Negative.  Negative for abdominal distention, blood in stool, GERD and indigestion.   Endocrine: Negative.    Genitourinary: Negative.  Negative for breast discharge, breast lump, breast pain, dysuria and hematuria.   Skin: Negative.           Objective   /60   Resp 16   Wt 66.2 kg (146 lb)   LMP 06/05/2024   BMI 22.87 kg/m²     Physical Exam  Vitals reviewed. Exam conducted with a chaperone present.   Constitutional:       Appearance: Normal appearance.   Cardiovascular:      Rate and Rhythm: Normal rate and regular rhythm.      Heart sounds: Normal heart sounds.   Pulmonary:      Effort: Pulmonary effort is normal.      Breath sounds: Normal breath sounds.   Chest:      Comments: Self breast awareness reinforced  Abdominal:      General: Abdomen is flat. Bowel sounds are normal.      Palpations: Abdomen is soft.   Genitourinary:     General: Normal vulva.      Exam position: Lithotomy position.      Vagina: Normal.      Cervix: Normal.      Uterus: Normal.       Adnexa: Right adnexa normal and left adnexa normal.      Rectum: Normal.      Comments: Rectal exam not done but appears normal visually  Neurological:      Mental Status: She is alert and oriented to person, place, and time.   Psychiatric:         Mood and Affect: Mood normal.         Behavior: Behavior normal.         Thought Content: Thought content normal.         Judgment: Judgment normal.            Diagnoses and all orders for this visit:    1. LGSIL on Pap smear of cervix (Primary)  -     LIQUID-BASED PAP SMEAR WITH HPV GENOTYPING REGARDLESS OF INTERPRETATION (PRAMOD,COR,MAD); Future  -     LIQUID-BASED PAP SMEAR WITH HPV GENOTYPING REGARDLESS OF INTERPRETATION (PRAMOD,COR,MAD)    2. Routine physical examination  -     norgestimate-ethinyl estradiol (Tri-Sprintec) 0.18/0.215/0.25 MG-35 MCG per tablet; Take 1 tablet by mouth Daily.  Dispense: 90 tablet; Refill: 3        Prescription(s) for OCP's were refilled today     The importance of keeping  all planned follow-up and taking all medications as prescribed was emphasized.    Today I discussed with Socorro the total recommended calcium intake for a premenopausal female is 1000 mg.  Ideally this should be from dietary sources.  I reviewed calcium content in various foods including milk, fortified orange juice and yogurt.  If she cannot get sufficient calcium through dietary means, it is recommended to supplement with either a multivitamin or calcium to reach her daily goal.  I also reviewed the difference in the bioavailability of calcium carbonate and calcium citrate containing supplements and the importance of taking calcium carbonate containing products with food.  Finally, vitamin D's role in calcium absorption was reviewed and a total daily vitamin D intake of 800 units was recommended.    I discussed with Socorro that she may be behind on needed vaccinations for  vaccines are up to date .  She may be able to obtain these vaccinations at her local pharmacy OR speak about obtaining them with her primary care.  If she does obtain her vaccines, I have asked Socorro to let us know the date each vaccine was obtained so that her medical record could be updated in our system.    New Medications Ordered This Visit   Medications    norgestimate-ethinyl estradiol (Tri-Sprintec) 0.18/0.215/0.25 MG-35 MCG per tablet     Sig: Take 1 tablet by mouth Daily.     Dispense:  90 tablet     Refill:  3            Return in about 1 year (around 7/1/2025) for Annual physical.    Ronit Mckinley, MARIAELENA  July 1, 2024

## 2024-07-08 LAB — REF LAB TEST METHOD: NORMAL

## 2024-09-17 ENCOUNTER — OFFICE VISIT (OUTPATIENT)
Dept: OBSTETRICS AND GYNECOLOGY | Facility: CLINIC | Age: 24
End: 2024-09-17
Payer: COMMERCIAL

## 2024-09-17 VITALS
DIASTOLIC BLOOD PRESSURE: 82 MMHG | BODY MASS INDEX: 22.6 KG/M2 | WEIGHT: 144 LBS | SYSTOLIC BLOOD PRESSURE: 138 MMHG | HEIGHT: 67 IN

## 2024-09-17 DIAGNOSIS — Z13.9 SPECIAL SCREENING: ICD-10-CM

## 2024-09-17 DIAGNOSIS — R87.610 ASCUS WITH POSITIVE HIGH RISK HPV CERVICAL: Primary | ICD-10-CM

## 2024-09-17 DIAGNOSIS — R87.810 ASCUS WITH POSITIVE HIGH RISK HPV CERVICAL: Primary | ICD-10-CM

## 2024-09-17 LAB
B-HCG UR QL: NEGATIVE
EXPIRATION DATE: NORMAL
INTERNAL NEGATIVE CONTROL: NEGATIVE
INTERNAL POSITIVE CONTROL: POSITIVE
Lab: NORMAL

## 2024-09-17 PROCEDURE — 81025 URINE PREGNANCY TEST: CPT | Performed by: OBSTETRICS & GYNECOLOGY

## 2024-09-17 PROCEDURE — 57454 BX/CURETT OF CERVIX W/SCOPE: CPT | Performed by: OBSTETRICS & GYNECOLOGY

## 2024-09-20 LAB — REF LAB TEST METHOD: NORMAL

## 2025-02-20 ENCOUNTER — OFFICE VISIT (OUTPATIENT)
Dept: INTERNAL MEDICINE | Facility: CLINIC | Age: 25
End: 2025-02-20
Payer: COMMERCIAL

## 2025-02-20 VITALS
TEMPERATURE: 98.2 F | HEIGHT: 67 IN | DIASTOLIC BLOOD PRESSURE: 74 MMHG | WEIGHT: 132 LBS | BODY MASS INDEX: 20.72 KG/M2 | SYSTOLIC BLOOD PRESSURE: 116 MMHG

## 2025-02-20 DIAGNOSIS — Z00.00 ANNUAL PHYSICAL EXAM: Primary | ICD-10-CM

## 2025-02-20 DIAGNOSIS — Z13.29 SCREENING FOR THYROID DISORDER: ICD-10-CM

## 2025-02-20 DIAGNOSIS — Z13.228 SCREENING FOR METABOLIC DISORDER: ICD-10-CM

## 2025-02-20 DIAGNOSIS — Z13.21 ENCOUNTER FOR VITAMIN DEFICIENCY SCREENING: ICD-10-CM

## 2025-02-20 DIAGNOSIS — Z13.220 LIPID SCREENING: ICD-10-CM

## 2025-02-20 DIAGNOSIS — M54.32 SCIATICA OF LEFT SIDE: ICD-10-CM

## 2025-02-20 PROCEDURE — 90471 IMMUNIZATION ADMIN: CPT | Performed by: PHYSICIAN ASSISTANT

## 2025-02-20 PROCEDURE — 99213 OFFICE O/P EST LOW 20 MIN: CPT | Performed by: PHYSICIAN ASSISTANT

## 2025-02-20 PROCEDURE — 90715 TDAP VACCINE 7 YRS/> IM: CPT | Performed by: PHYSICIAN ASSISTANT

## 2025-02-20 PROCEDURE — 99395 PREV VISIT EST AGE 18-39: CPT | Performed by: PHYSICIAN ASSISTANT

## 2025-02-20 NOTE — PROGRESS NOTES
Office Note     Name: Socorro Spaulding    : 2000     MRN: [id number]     Chief Complaint  sciatic pain (For 6 months) and Annual Exam    Subjective     History of Present Illness:  Socorro Spaulding is a 24 y.o. female who presents today for annual exam and sciatica.    History of Present Illness  The patient presents for evaluation of sciatica.    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 The pain, which she believes is sciatic in nature, persists throughout the day. Despite maintaining an active lifestyle, including bi-weekly Orange Theory workouts, running, and weightlifting, the pain does not subside. She also engages in core, lower back, and glute exercises once a week. The pain typically manifests towards the end of her workday, originating from her lower back and radiating down her leg. She has attempted to alleviate the discomfort by using a standing desk and a cushioned pad for her chair, but these measures have not been effective. The pain has been a consistent issue for approximately 6 months. She finds temporary relief from the pain when she stands for about 30 minutes during her lunch break, but the pain returns when she resumes sitting. The pain is absent in the mornings and during weekends. She has lost 30 pounds over the past year through diet and exercise. She has not been sick recently. She has not had the influenza vaccine. She gets her eyes checked and goes to the dentist. She has been getting her Pap smears done at gynecology every 6 months and her last Pap smear was normal. Her bowel movements are regular. She reports no difficulty swallowing. She reports no knee or shoulder pain.    SOCIAL HISTORY  She works full time at [employer]. She lives with her boyfriend.    FAMILY HISTORY  She reports no family history of any conditions.    IMMUNIZATIONS  She has not had the influenza vaccine.    Review of Systems:    Review of Systems   Cardiovascular:  Negative for leg swelling.   Gastrointestinal:  Negative for abdominal pain, constipation and diarrhea.   Musculoskeletal:  Positive for back pain (sciatic pain radiates down L leg).       Past Medical History:   Past Medical History:   Diagnosis Date    Abnormal Pap smear of cervix     Herpes     History of herpes genitalis     HPV (human papilloma virus) infection        Past Surgical History:   Past Surgical History:   Procedure Laterality Date    CYST REMOVAL  2013    Pilonidal cyst    WISDOM TOOTH EXTRACTION  04/2019       Family History:   Family History   Problem Relation Age of Onset    Cancer Paternal Grandfather     Cancer Maternal Grandmother     Breast cancer Neg Hx     Ovarian cancer Neg Hx     Colon cancer Neg Hx     Osteoporosis Neg Hx     Pancreatic cancer Neg Hx     Prostate cancer Neg Hx        Social History:   Social History     Socioeconomic History    Marital status: Single   Tobacco Use    Smoking status: Never     Passive exposure: Never    Smokeless tobacco: Never   Vaping Use    Vaping status: Never Used   Substance and Sexual Activity    Alcohol use: Yes     Alcohol/week: 2.0 standard drinks of alcohol     Types: 2 Drinks containing 0.5 oz of alcohol per week     Comment: social    Drug use: Never    Sexual activity: Yes     Partners: Male     Birth control/protection: Condom, Birth control pill       Immunizations:   Immunization History   Administered Date(s) Administered    COVID-19 (PFIZER) Purple Cap Monovalent 03/29/2021, 04/19/2021    Hpv9 05/22/2023, 07/20/2023, 11/29/2023    Tdap 02/20/2025        Medications:     Current Outpatient Medications:     norgestimate-ethinyl estradiol (Tri-Sprintec) 0.18/0.215/0.25 MG-35 MCG per tablet, Take 1 tablet by mouth Daily., Disp: 90 tablet, Rfl: 3    Allergies:   No Known Allergies    Objective     Vital Signs  /74 (BP Location: Left arm, Patient Position: Sitting, Cuff Size: Adult)   Temp 98.2 °F  "(36.8 °C) (Temporal)   Ht 170.2 cm (67.01\")   Wt 59.9 kg (132 lb)   BMI 20.67 kg/m²   Body mass index is 20.67 kg/m².     BMI is within normal parameters. No other follow-up for BMI required.       Physical Exam  Constitutional:       General: She is not in acute distress.     Appearance: Normal appearance. She is normal weight. She is not ill-appearing.   HENT:      Head: Normocephalic and atraumatic.      Right Ear: Tympanic membrane and ear canal normal.      Left Ear: Tympanic membrane and ear canal normal.      Nose: Nose normal.      Mouth/Throat:      Mouth: Mucous membranes are moist.      Pharynx: Oropharynx is clear.   Eyes:      Conjunctiva/sclera: Conjunctivae normal.      Pupils: Pupils are equal, round, and reactive to light.   Cardiovascular:      Rate and Rhythm: Normal rate and regular rhythm.      Heart sounds: Normal heart sounds.   Pulmonary:      Effort: Pulmonary effort is normal.      Breath sounds: Normal breath sounds.   Musculoskeletal:      Right lower leg: No edema.      Left lower leg: No edema.   Skin:     General: Skin is warm and dry.   Neurological:      General: No focal deficit present.      Mental Status: She is alert and oriented to person, place, and time.   Psychiatric:         Mood and Affect: Mood normal.         Behavior: Behavior normal.       Procedures     Results:  No results found for this or any previous visit (from the past 24 hours).     Assessment and Plan     Assessment/Plan:  Diagnoses and all orders for this visit:    1. Annual physical exam (Primary)    2. Sciatica of left side  -     Ambulatory Referral to Physical Therapy for Evaluation & Treatment    3. Encounter for vitamin deficiency screening  -     Vitamin D,25-Hydroxy; Future  -     Vitamin B12; Future    4. Lipid screening  -     Lipid Panel; Future    5. Screening for metabolic disorder  -     CBC & Differential; Future  -     Comprehensive Metabolic Panel; Future    6. Screening for thyroid " disorder  -     TSH; Future  -     T4, Free; Future  -     T3, Free; Future    Other orders  -     Tdap Vaccine Greater Than or Equal To 8yo         Assessment & Plan  1. Sciatica.  The patient's symptoms are consistent with sciatica, likely exacerbated by prolonged sitting at work. She reports pain starting from the lower back and radiating down the leg, which worsens by the end of the workday. She has been using a standing desk and a cushion, but the pain persists. A referral for physical therapy will be made to provide ergonomic recommendations and potential exercises to alleviate symptoms. She is advised to take breaks during work to walk and stretch.    2. Health maintenance.  She will receive a Tdap vaccine today, as she is due for her tetanus and pertussis booster. Fasting labs will be ordered to assess cholesterol, blood sugar, kidney function, liver function, thyroid levels, and vitamin levels.    Follow-up  The patient will follow up in 1 year.      Follow Up  No follow-ups on file.    Patient or patient representative verbalized consent for the use of Ambient Listening during the visit with  Coby Lopez PA-C for chart documentation. 2/20/2025  16:23 EST      Coby Lopez PA-C   [facility] [address]

## 2025-02-20 NOTE — LETTER
[facility]  Vaccine Consent Form    Patient Name:  Socorro Spaulding  Patient :  2000     Vaccine(s) Ordered    Tdap Vaccine Greater Than or Equal To 6yo IM        Screening Checklist  The following questions should be completed prior to vaccination. If you answer “yes” to any question, it does not necessarily mean you should not be vaccinated. It just means we may need to clarify or ask more questions. If a question is unclear, please ask your healthcare provider to explain it.    Yes No   Any fever or moderate to severe illness today (mild illness and/or antibiotic treatment are not contraindications)?     Do you have a history of a serious reaction to any previous vaccinations, such as anaphylaxis, encephalopathy within 7 days, Guillain-Leawood syndrome within 6 weeks, seizure?     Have you received any live vaccine(s) (e.g MMR, NOEMI) or any other vaccines in the last month (to ensure duplicate doses aren't given)?     Do you have an anaphylactic allergy to latex (DTaP, DTaP-IPV, Hep A, Hep B, MenB, RV, Td, Tdap), baker’s yeast (Hep B, HPV), polysorbates (RSV, nirsevimab, PCV 20, Rotavirrus, Tdap, Shingrix), or gelatin (NOEMI, MMR)?     Do you have an anaphylactic allergy to neomycin (Rabies, NOEMI, MMR, IPV, Hep A), polymyxin B (IPV), or streptomycin (IPV)?      Any cancer, leukemia, AIDS, or other immune system disorder? (NOEMI, MMR, RV)     Do you have a parent, brother, or sister with an immune system problem (if immune competence of vaccine recipient clinically verified, can proceed)? (MMR, NOEMI)     Any recent steroid treatments for >2 weeks, chemotherapy, or radiation treatment? (NOEMI, MMR)     Have you received antibody-containing blood transfusions or IVIG in the past 11 months (recommended interval is dependent on product)? (MMR, NOEMI)     Have you taken antiviral drugs (acyclovir, famciclovir, valacyclovir for NOEMI) in the last 24 or 48 hours, respectively?      Are you pregnant or planning to  "become pregnant within 1 month? (NOEMI, MMR, HPV, IPV, MenB, Abrexvy; For Hep B- refer to Engerix-B; For RSV - Abrysvo is indicated for 32-36 weeks of pregnancy from September to January)     For infants, have you ever been told your child has had intussusception or a medical emergency involving obstruction of the intestine (Rotavirus)? If not for an infant, can skip this question.         *Ordering Physicians/APC should be consulted if \"yes\" is checked by the patient or guardian above.  I have received, read, and understand the Vaccine Information Statement (VIS) for each vaccine ordered.  I have considered my or my child's health status as well as the health status of my close contacts.  I have taken the opportunity to discuss my vaccine questions with my or my child's health care provider.   I have requested that the ordered vaccine(s) be given to me or my child.  I understand the benefits and risks of the vaccines.  I understand that I should remain in the clinic for 15 minutes after receiving the vaccine(s).  _________________________________________________________  Signature of Patient or Parent/Legal Guardian ____________________  Date     "

## 2025-03-10 ENCOUNTER — TREATMENT (OUTPATIENT)
Dept: PHYSICAL THERAPY | Facility: CLINIC | Age: 25
End: 2025-03-10
Payer: COMMERCIAL

## 2025-03-10 DIAGNOSIS — M54.32 SCIATICA OF LEFT SIDE: Primary | ICD-10-CM

## 2025-03-10 PROCEDURE — 97161 PT EVAL LOW COMPLEX 20 MIN: CPT | Performed by: PHYSICAL THERAPIST

## 2025-03-10 PROCEDURE — 97110 THERAPEUTIC EXERCISES: CPT | Performed by: PHYSICAL THERAPIST

## 2025-03-10 PROCEDURE — 97535 SELF CARE MNGMENT TRAINING: CPT | Performed by: PHYSICAL THERAPIST

## 2025-03-10 NOTE — PROGRESS NOTES
"Physical Therapy Initial Evaluation and Plan of Care  [address], [address], [place] [address]    Patient: Socorro Spaulding   : 2000  Diagnosis/ICD-10 Code:  Sciatica of left side [M54.32]  Referring practitioner: DARRYL Reveles  Date of Initial Visit: 3/10/2025  Today's Date: 3/10/2025  Patient seen for 1 session         Visit Diagnoses:    ICD-10-CM ICD-9-CM   1. Sciatica of left side  M54.32 724.3         Subjective Questionnaire: LEFS: 8080    Patient Active Problem List    Diagnosis Date Noted    Annual physical exam 2025    Sciatica of left side 2025    Well woman exam with routine gynecological exam 2023     Note Last Updated: 2024     SCREENING TESTS    Year 2017 2018 2019   Age       [age]            PAP     4  2  LGSIL 1  ASCUS            HPV high risk       +other 1            RENETTA [Birads]                    SIN (5 year)  Tyrer Huntington (lifetime)                    Colonoscopy                    DEXA [T-score]  Frax [hip/any]                      Enter the month test was performed.  If month not known, enter \"X'  Black numbers = normal results  Red numbers = abnormal results  Black X = patient reported normal  Red X - patient reported abnormal      Referred by:    Profession:    Other info:            Routine physical examination 2022     Note Last Updated: 2022     Order for fasting labs placed.  She has completed 2 Covid vaccinations.  Discussed importance of booster dose.      Genital herpes simplex type 1 infection 2021     Note Last Updated: 2022     Diagnosed summer 2020.  Has completed daily acyclovir for 1 year.  She has not had any other outbreaks.      Routine gynecological examination 2021     Note Last Updated: 2021     Breast exam unremarkable.  Pap obtained.  Pelvic exam within normal range.  Patient was on her menstrual " cycle.  Additional testing for HPV, gonorrhea, and chlamydia obtained.      Encounter for initial prescription of contraceptive pills 2020     Note Last Updated: 2021     Continue norgestimate ethinyl estradiol tablets daily.  Refill for 1 year was sent in January.      BMI 22.0-22.9, adult 2020     Note Last Updated: 2021     Discussed importance of healthy diet and regular cardiovascular exercise.       Past Medical History:   Diagnosis Date    Abnormal Pap smear of cervix     Herpes     History of herpes genitalis     HPV (human papilloma virus) infection      Past Surgical History:   Procedure Laterality Date    CYST REMOVAL  2013    Pilonidal cyst    WISDOM TOOTH EXTRACTION  2019       Subjective Evaluation    History of Present Illness  Date of onset: 9/10/2024  Mechanism of injury: Patient notes onset of left sided leg pain after prolonged sitting (> a few hours).  No referral past the knee, only notices when she is sitting at work, pain buttock to jail down the thigh posteriorly. Pain basically stable at this point, no other aggravators. No strength changes no limitations in activity. Exercsies regularly, running, walking, weights (orange theory 2x wk). Insidious and gradual onset of symptoms.       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 Pain  Current pain ratin  At best pain ratin  At worst pain ratin  Quality: dull ache (tingling/numb)  Relieving factors: change in position, support and rest  Exacerbated by: sitting.    Diagnostic Tests  No diagnostic tests performed    Treatments  No previous or current treatments  Patient Goals  Patient goals for therapy: decreased pain           Objective          Postural Observations    Additional Postural Observation Details  Flat back appearance    Palpation     Additional Palpation Details  Mild ischial tuberosity tenderness without referral. No external rotator tenderness, no  referral. Mild trochanteric tenderness noted without referral. No lumbar tenderness, no local HS tenderness. No sacral border tenderness.     Neurological Testing     Sensation     Lumbar   Left   Intact: light touch    Right   Intact: light touch    Reflexes   Left   Patellar (L4): absent (0)  Achilles (S1): absent (0)  Clonus sign: negative    Right   Patellar (L4): absent (0)  Achilles (S1): absent (0)  Clonus sign: negative    Active Range of Motion     Lumbar   Normal active range of motion    Passive Range of Motion     Additional Passive Range of Motion Details  25% passive hip IR/ER limitation RLE compared toe LLE    Strength/Myotome Testing     Left Hip   Planes of Motion   Flexion: 4  Extension: 4+  Abduction: 4    Right Hip   Planes of Motion   Flexion: 4  Extension: 4+  Abduction: 4    Left Knee   Flexion: 4+  Extension: 5    Right Knee   Flexion: 5  Extension: 5    Left Ankle/Foot   Dorsiflexion: 4  Great toe extension: 4+    Right Ankle/Foot   Dorsiflexion: 5  Great toe extension: 4+    Tests       Thoracic   Negative slump.     Lumbar     Left   Negative passive SLR.     Left Pelvic Girdle/Sacrum   Negative: sacral spring and thigh thrust.     Right Pelvic Girdle/Sacrum   Negative: sacral spring and thigh thrust.           Assessment & Plan       Assessment  Impairments: abnormal gait, activity intolerance, impaired physical strength, lacks appropriate home exercise program and pain with function   Functional limitations: uncomfortable because of pain and sitting   Assessment details: Patient presents with complaints of left sided posterior thigh pain after prolonged sitting, specifically at work.  No neural tension is noted but has mild weakness in left dorsiflexion, possibly incidentally. S/S most consistent with likely ischial bursitis vs piriformis syndrome.  Will work on HS and hip rotator strengthening and gauge improvements with sitting pain. With no initiate improvement may work on adductor  and pelvic floor (S)ing with abductor strengthening. Educated in likely sources of symptoms and to try to avoid uneven sitting on the left side as well as alternative options like standing desk.   Prognosis: good    Goals  Plan Goals: STG 3 wks  1. Patient to sit 2 hours without pain/symptoms  2. Patient to demonstrate 5/5 strength in dorsiflexion LLE    LTG 6 wks  1. Patient to be independent with long term HEP  2. Patient to sit for an entire shift with no increase in pain  3. Patient to report >80% improvement in symptoms      Plan  Therapy options: will be seen for skilled therapy services  Planned modality interventions: electrical stimulation/Russian stimulation, TENS, cryotherapy, dry needling and thermotherapy (hydrocollator packs)  Planned therapy interventions: manual therapy, therapeutic activities, postural training, body mechanics training, functional ROM exercises, flexibility, gait training, stretching, strengthening, joint mobilization, neuromuscular re-education, soft tissue mobilization and spinal/joint mobilization  Frequency: 1x week  Duration in visits: 6  Treatment plan discussed with: patient        History # of Personal Factors and/or Comorbidities: MODERATE (1-2)  Examination of Body System(s): # of elements: MODERATE (3)  Clinical Presentation: STABLE   Clinical Decision Making: LOW       Timed:         Manual Therapy:    0     mins  46540;     Therapeutic Exercise:    20     mins  78308;     Neuromuscular Sacha:    0    mins  24209;    Therapeutic Activity:     0     mins  56970;     Gait Trainin     mins  12227;     Ultrasound:     0     mins  67404;    Ionto                               0    mins   06934  Self Care                       10     mins   50259        Un-Timed:  Electrical Stimulation:    0     mins  48232 ( );  Dry Needling     0     mins self-pay  Traction     0     mins 68356  Low Eval     15     Mins  71937  Mod Eval     0     Mins  38311  High Eval                        0     Mins  58748  Canalith Repos    0     mins 63285      Timed Treatment:   30   mins   Total Treatment:     45   mins          PT: Luis Manuel Reed, PT     License Number: [id number]    Electronically signed by Luis Manuel Reed, PT, 03/10/25, 3:38 PM EDT    Certification Period: 3/10/2025 thru 6/7/2025  I certify that the therapy services are furnished while this patient is under my care.  The services outlined above are required by this patient, and will be reviewed every 90 days.         Physician Signature:__________________________________________________    PHYSICIAN: Coby Lopez PA-C  NPI: [id number]      DATE:     Please sign and return via fax to [email or web address] . Thank you, [facility].

## 2025-03-17 ENCOUNTER — TREATMENT (OUTPATIENT)
Dept: PHYSICAL THERAPY | Facility: CLINIC | Age: 25
End: 2025-03-17
Payer: COMMERCIAL

## 2025-03-17 DIAGNOSIS — M54.32 SCIATICA OF LEFT SIDE: Primary | ICD-10-CM

## 2025-03-17 PROCEDURE — 97110 THERAPEUTIC EXERCISES: CPT | Performed by: PHYSICAL THERAPIST

## 2025-03-17 PROCEDURE — 97112 NEUROMUSCULAR REEDUCATION: CPT | Performed by: PHYSICAL THERAPIST

## 2025-03-18 NOTE — PROGRESS NOTES
Physical Therapy Daily Treatment Note  [address], [address], [facility] [address]      Patient: Socorro Spaulding   : 2000  Referring practitioner: GIOVANI Reveles*  Date of Initial Visit: Type: THERAPY  Noted: 3/10/2025  Today's Date: 3/17/2025  Patient seen for 2 sessions       Visit Diagnoses:    ICD-10-CM ICD-9-CM   1. Sciatica of left side  M54.32 724.3       Subjective   Patient reports that she is doing about the same.  No pain unless she sits for more than an hour in the left buttocks in particular.  her pain level is 0/10 upon arrival.      Objective   Tender along the left SIJ. No tenderness at the ischial tuberosity.     Hip abduction strength: 3+/5 LLE, 4+/5 RLE  DF 5/5 bilateral    See Exercise, Manual, and Modality Logs for complete treatment.       Assessment & Plan       Assessment  Assessment details: Patient tolerates treatment well. There seems to be a mechanical SIJ piece to her pain but it is not irritable with unilateral loading or an instability issue. Discussed activation of the hip rotators in sitting when she feels pain upcoming.  Will give two weeks to work on current program and follow up for reassessment.           Timed:         Manual Therapy:    0     mins  38506;     Therapeutic Exercise:    25     mins  38851;     Neuromuscular Sacha:    11    mins  50784;    Therapeutic Activity:     0     mins  71008;     Gait Trainin     mins  34500;     Ultrasound:     0     mins  24629;    Ionto                               0    mins   63152  Self Care                       0     mins   69797  Canalith Repos    0     mins 31814      Un-Timed:  Electrical Stimulation:    0     mins  75924 ( );  Dry Needling     0     mins self-pay  Traction     0     mins 41336      Timed Treatment:   36   mins   Total Treatment:     36   mins    Luis Manuel Reed, PT  [place] License: [id number]

## 2025-08-01 ENCOUNTER — LAB (OUTPATIENT)
Facility: HOSPITAL | Age: 25
End: 2025-08-01
Payer: COMMERCIAL

## 2025-08-01 DIAGNOSIS — Z13.21 ENCOUNTER FOR VITAMIN DEFICIENCY SCREENING: ICD-10-CM

## 2025-08-01 DIAGNOSIS — Z13.29 SCREENING FOR THYROID DISORDER: ICD-10-CM

## 2025-08-01 DIAGNOSIS — Z13.220 LIPID SCREENING: ICD-10-CM

## 2025-08-01 DIAGNOSIS — Z13.228 SCREENING FOR METABOLIC DISORDER: ICD-10-CM

## 2025-08-01 LAB
25(OH)D3 SERPL-MCNC: 49.7 NG/ML (ref 30–100)
ALBUMIN SERPL-MCNC: 4.5 G/DL (ref 3.5–5.2)
ALBUMIN/GLOB SERPL: 1.6 G/DL
ALP SERPL-CCNC: 43 U/L (ref 39–117)
ALT SERPL W P-5'-P-CCNC: 26 U/L (ref 1–33)
ANION GAP SERPL CALCULATED.3IONS-SCNC: 11.7 MMOL/L (ref 5–15)
AST SERPL-CCNC: 33 U/L (ref 1–32)
BASOPHILS # BLD AUTO: 0.03 10*3/MM3 (ref 0–0.2)
BASOPHILS # BLD MANUAL: 0.04 10*3/MM3 (ref 0–0.2)
BASOPHILS NFR BLD AUTO: 0.9 % (ref 0–1.5)
BASOPHILS NFR BLD MANUAL: 1 % (ref 0–1.5)
BILIRUB SERPL-MCNC: 0.4 MG/DL (ref 0–1.2)
BUN SERPL-MCNC: 13 MG/DL (ref 6–20)
BUN/CREAT SERPL: 12.1 (ref 7–25)
CALCIUM SPEC-SCNC: 9.4 MG/DL (ref 8.6–10.5)
CHLORIDE SERPL-SCNC: 104 MMOL/L (ref 98–107)
CHOLEST SERPL-MCNC: 199 MG/DL (ref 0–200)
CO2 SERPL-SCNC: 25.3 MMOL/L (ref 22–29)
CREAT SERPL-MCNC: 1.07 MG/DL (ref 0.57–1)
DEPRECATED RDW RBC AUTO: 42.8 FL (ref 37–54)
EGFRCR SERPLBLD CKD-EPI 2021: 74.1 ML/MIN/1.73
EOSINOPHIL # BLD AUTO: 0.11 10*3/MM3 (ref 0–0.4)
EOSINOPHIL # BLD MANUAL: 0.07 10*3/MM3 (ref 0–0.4)
EOSINOPHIL NFR BLD AUTO: 3.2 % (ref 0.3–6.2)
EOSINOPHIL NFR BLD MANUAL: 2 % (ref 0.3–6.2)
ERYTHROCYTE [DISTWIDTH] IN BLOOD BY AUTOMATED COUNT: 12.1 % (ref 12.3–15.4)
GLOBULIN UR ELPH-MCNC: 2.9 GM/DL
GLUCOSE SERPL-MCNC: 85 MG/DL (ref 65–99)
HCT VFR BLD AUTO: 41.7 % (ref 34–46.6)
HDLC SERPL-MCNC: 98 MG/DL (ref 40–60)
HGB BLD-MCNC: 13.6 G/DL (ref 12–15.9)
IMM GRANULOCYTES # BLD AUTO: 0.01 10*3/MM3 (ref 0–0.05)
IMM GRANULOCYTES NFR BLD AUTO: 0.3 % (ref 0–0.5)
LDLC SERPL CALC-MCNC: 91 MG/DL (ref 0–100)
LDLC/HDLC SERPL: 0.92 {RATIO}
LYMPHOCYTES # BLD AUTO: 1.49 10*3/MM3 (ref 0.7–3.1)
LYMPHOCYTES # BLD MANUAL: 1.69 10*3/MM3 (ref 0.7–3.1)
LYMPHOCYTES NFR BLD AUTO: 44 % (ref 19.6–45.3)
LYMPHOCYTES NFR BLD MANUAL: 2 % (ref 5–12)
MCH RBC QN AUTO: 31.4 PG (ref 26.6–33)
MCHC RBC AUTO-ENTMCNC: 32.6 G/DL (ref 31.5–35.7)
MCV RBC AUTO: 96.3 FL (ref 79–97)
MONOCYTES # BLD AUTO: 0.26 10*3/MM3 (ref 0.1–0.9)
MONOCYTES # BLD: 0.07 10*3/MM3 (ref 0.1–0.9)
MONOCYTES NFR BLD AUTO: 7.7 % (ref 5–12)
NEUTROPHILS # BLD AUTO: 1.66 10*3/MM3 (ref 1.7–7)
NEUTROPHILS NFR BLD AUTO: 1.49 10*3/MM3 (ref 1.7–7)
NEUTROPHILS NFR BLD AUTO: 43.9 % (ref 42.7–76)
NEUTROPHILS NFR BLD MANUAL: 46.9 % (ref 42.7–76)
OVALOCYTES BLD QL SMEAR: ABNORMAL
PLAT MORPH BLD: NORMAL
PLATELET # BLD AUTO: 239 10*3/MM3 (ref 140–450)
PMV BLD AUTO: 11.1 FL (ref 6–12)
POIKILOCYTOSIS BLD QL SMEAR: ABNORMAL
POTASSIUM SERPL-SCNC: 4.9 MMOL/L (ref 3.5–5.2)
PROT SERPL-MCNC: 7.4 G/DL (ref 6–8.5)
RBC # BLD AUTO: 4.33 10*6/MM3 (ref 3.77–5.28)
SODIUM SERPL-SCNC: 141 MMOL/L (ref 136–145)
T3FREE SERPL-MCNC: 1.82 PG/ML (ref 2–4.4)
T4 FREE SERPL-MCNC: 0.96 NG/DL (ref 0.92–1.68)
TRIGL SERPL-MCNC: 54 MG/DL (ref 0–150)
TSH SERPL DL<=0.05 MIU/L-ACNC: 2.67 UIU/ML (ref 0.27–4.2)
VARIANT LYMPHS NFR BLD MANUAL: 48 % (ref 19.6–45.3)
VIT B12 BLD-MCNC: 685 PG/ML (ref 211–946)
VLDLC SERPL-MCNC: 10 MG/DL (ref 5–40)
WBC MORPH BLD: NORMAL
WBC NRBC COR # BLD AUTO: 3.39 10*3/MM3 (ref 3.4–10.8)

## 2025-08-01 PROCEDURE — 82306 VITAMIN D 25 HYDROXY: CPT

## 2025-08-01 PROCEDURE — 80050 GENERAL HEALTH PANEL: CPT

## 2025-08-01 PROCEDURE — 82607 VITAMIN B-12: CPT

## 2025-08-01 PROCEDURE — 80061 LIPID PANEL: CPT

## 2025-08-01 PROCEDURE — 84481 FREE ASSAY (FT-3): CPT

## 2025-08-01 PROCEDURE — 84439 ASSAY OF FREE THYROXINE: CPT

## 2025-08-01 PROCEDURE — 85007 BL SMEAR W/DIFF WBC COUNT: CPT

## 2025-08-10 ENCOUNTER — RESULTS FOLLOW-UP (OUTPATIENT)
Facility: HOSPITAL | Age: 25
End: 2025-08-10
Payer: COMMERCIAL

## 2025-08-19 ENCOUNTER — OFFICE VISIT (OUTPATIENT)
Dept: INTERNAL MEDICINE | Facility: CLINIC | Age: 25
End: 2025-08-19
Payer: COMMERCIAL

## 2025-08-19 VITALS
HEART RATE: 56 BPM | DIASTOLIC BLOOD PRESSURE: 74 MMHG | BODY MASS INDEX: 21.08 KG/M2 | SYSTOLIC BLOOD PRESSURE: 118 MMHG | WEIGHT: 134.6 LBS | OXYGEN SATURATION: 98 % | TEMPERATURE: 98.7 F

## 2025-08-19 DIAGNOSIS — D72.818 OTHER DECREASED WHITE BLOOD CELL (WBC) COUNT: Primary | ICD-10-CM

## 2025-08-19 DIAGNOSIS — Z13.228 SCREENING FOR METABOLIC DISORDER: ICD-10-CM

## 2025-08-19 PROBLEM — D72.819 LEUCOPENIA: Status: ACTIVE | Noted: 2025-08-19

## 2025-08-19 PROCEDURE — 99213 OFFICE O/P EST LOW 20 MIN: CPT | Performed by: PHYSICIAN ASSISTANT

## 2025-08-23 DIAGNOSIS — Z00.00 ROUTINE PHYSICAL EXAMINATION: ICD-10-CM

## 2025-08-25 RX ORDER — NORGESTIMATE AND ETHINYL ESTRADIOL 7DAYSX3 28
1 KIT ORAL DAILY
Qty: 28 TABLET | Refills: 0 | Status: SHIPPED | OUTPATIENT
Start: 2025-08-25